# Patient Record
Sex: FEMALE | Race: WHITE
[De-identification: names, ages, dates, MRNs, and addresses within clinical notes are randomized per-mention and may not be internally consistent; named-entity substitution may affect disease eponyms.]

---

## 2018-01-10 ENCOUNTER — HOSPITAL ENCOUNTER (OUTPATIENT)
Dept: HOSPITAL 24 - RAD | Age: 68
End: 2018-01-10
Attending: SPECIALIST
Payer: COMMERCIAL

## 2018-01-10 DIAGNOSIS — Z12.31: Primary | ICD-10-CM

## 2018-01-10 PROCEDURE — 77067 SCR MAMMO BI INCL CAD: CPT

## 2018-01-11 NOTE — MG
Examination: Bilateral screening mammogram.



Clinical history: Routine screening. Personal history of left breast carcinoma with left mastectomy a
nd subsequent reconstructive left breast surgery.



Technique: Digital CC and MLO views of both breasts were obtained. Computer aided detection analysis 
was performed and used during the interpretation.



Comparison: 01/03/2017.



Findings:



The right breast is heterogeneously dense, reducing the sensitivity of mammography. There are stable 
postsurgical changes from reconstructive left breast surgery, with the left breast composed predomina
ntly of adipose tissue. Benign-appearing calcifications are noted in the breasts bilaterally. A skin 
mole is present overlying the right breast.



No suspicious mass, area of architectural distortion or suspicious cluster of microcalcifications is 
noted.



Impression:



1. No mammographic evidence of malignancy.



BI-RADS category 2-benign findings.



Recommend routine annual screening mammogram.



Diagnostic CAD was utilized and reviewed.



* 0 (ZERO) - ASSESSMENT INCOMPLETE; ADDITIONAL IMAGING IS NEEDED.

* 0C - ASSESSMENT INCOMPLETE, NEEDS ADDITIONAL IMAGING EVALUATION AND/OR PRIOR MAMMOGRAMS FOR COMPARI
SON.

* 1/1 (ONE) - NEGATIVE.

* 2/II (TWO) - BENIGN FINDINGS.

* 3/III (THREE) - PROBABLY BENIGN FINDING; SHORT INTERVAL FOLLOW-UP SUGGESTED.

* 4/IV (FOUR) - SUSPICIOUS ABNORMALITY; BIOPSY SHOULD BE CONSIDERED.

* 5/V - HIGHLY SUSPICIOUS OF MALIGNANCY; BIOPSY SHOULD BE PERFORMED.

* 6/IV - KNOWN BIOPSY PROVEN MALIGNANCY-APPROPRIATE ACTION SHOULD BE TAKEN. 



A NEGATIVE X-RAY REPORT SHOULD NOT DELAY BIOPSY IF A DOMINANT OR

CLINICALLY SUSPICIOUS MASS IS PRESENT; 4 TO 8 PERCENT OF CANCERS

ARE NOT IDENTIFIED BY X-RAY. A NEGATIVE REPORT MAY REINFORCE THE

CLINICAL IMPRESSION. ADENOSIS AND DENSE BREASTS MAY OBSCURE AN

UNDERLYING NEOPLASM.







Reported By:Electronically Signed by RADHA TAYLOR MD at 1/11/2018 10:02:37 AM